# Patient Record
Sex: MALE | Employment: PART TIME | URBAN - METROPOLITAN AREA
[De-identification: names, ages, dates, MRNs, and addresses within clinical notes are randomized per-mention and may not be internally consistent; named-entity substitution may affect disease eponyms.]

---

## 2017-10-11 VITALS — DIASTOLIC BLOOD PRESSURE: 59 MMHG | SYSTOLIC BLOOD PRESSURE: 111 MMHG | WEIGHT: 149.94 LBS

## 2017-10-11 RX ORDER — HYDROCODONE BITARTRATE AND ACETAMINOPHEN 7.5; 325 MG/1; MG/1
1 TABLET ORAL EVERY 6 HOURS PRN
COMMUNITY

## 2017-10-12 ENCOUNTER — OFFICE VISIT (OUTPATIENT)
Dept: SURGERY | Facility: CLINIC | Age: 42
End: 2017-10-12
Payer: MEDICAID

## 2017-10-12 VITALS
BODY MASS INDEX: 20.18 KG/M2 | WEIGHT: 149 LBS | SYSTOLIC BLOOD PRESSURE: 111 MMHG | DIASTOLIC BLOOD PRESSURE: 59 MMHG | HEIGHT: 72 IN

## 2017-10-12 DIAGNOSIS — L02.413: Primary | ICD-10-CM

## 2017-10-12 PROCEDURE — 99024 POSTOP FOLLOW-UP VISIT: CPT | Mod: ,,, | Performed by: SURGERY

## 2017-10-12 NOTE — PROGRESS NOTES
Subjective:       Patient ID: Marcio Atkinson is a 41 y.o. male.    Chief Complaint: Post-op Evaluation ([facility] DOS 10/2/17 I&D Right Upper Extremity Abscess)      HPI:  Patient is s/p I and D of right eduin forearm abscess.  He is doing much better.  He has been packing the two wounds daily.  There is no more drainage.  He has no fevers or chills.      Review of Systems   Constitutional: Negative for appetite change, chills, fever and unexpected weight change.   HENT: Negative for hearing loss, rhinorrhea, sore throat and voice change.    Eyes: Negative for photophobia and visual disturbance.   Respiratory: Negative for cough, choking and shortness of breath.    Cardiovascular: Negative for chest pain, palpitations and leg swelling.   Gastrointestinal: Negative for abdominal pain, blood in stool, constipation, diarrhea, nausea and vomiting.   Endocrine: Negative for cold intolerance, heat intolerance, polydipsia and polyuria.   Musculoskeletal: Negative for arthralgias, back pain, joint swelling and neck stiffness.   Skin: Positive for wound (right arm and forearm). Negative for color change, pallor and rash.   Neurological: Negative for dizziness, seizures, syncope and headaches.   Hematological: Negative for adenopathy. Does not bruise/bleed easily.   Psychiatric/Behavioral: Negative for agitation, behavioral problems and confusion.       Objective:      Physical Exam   Constitutional: He appears well-developed.   HENT:   Head: Normocephalic and atraumatic.   Neck: Neck supple.   Pulmonary/Chest: Effort normal. No respiratory distress.   Skin:        The two wounds are small and healing well.  There is no swelling in the arm.  Still mild tenderness to touch.  No pus.  No cellulitis        Assessment/Plan:   Abscess of right upper arm and forearm  Comments:  He is s/p I and D.  He has been packing the wounds for the last week. Drainage is minimal.  Keep it covered.  RTC PRN         No Follow-up on file.

## 2017-11-20 ENCOUNTER — DOCUMENTATION ONLY (OUTPATIENT)
Dept: FAMILY MEDICINE | Facility: CLINIC | Age: 42
End: 2017-11-20

## 2017-11-20 NOTE — PROGRESS NOTES
Pre-Visit Chart Review  For Appointment Scheduled on 11/22/17.    Health Maintenance Due   Topic Date Due    Lipid Panel  1975    TETANUS VACCINE  11/19/1993    Pneumococcal PPSV23 (Medium Risk) (1) 11/19/1993    Influenza Vaccine  08/01/2017